# Patient Record
Sex: MALE | URBAN - METROPOLITAN AREA
[De-identification: names, ages, dates, MRNs, and addresses within clinical notes are randomized per-mention and may not be internally consistent; named-entity substitution may affect disease eponyms.]

---

## 2019-05-16 ENCOUNTER — IMPORTED ENCOUNTER (OUTPATIENT)
Dept: URBAN - METROPOLITAN AREA CLINIC 1 | Facility: CLINIC | Age: 34
End: 2019-05-16

## 2019-05-16 PROBLEM — H52.223: Noted: 2019-05-16

## 2019-05-16 PROBLEM — H52.13: Noted: 2019-05-16

## 2019-05-16 PROCEDURE — S0621 ROUTINE OPHTHALMOLOGICAL EXA: HCPCS

## 2019-05-16 NOTE — PATIENT DISCUSSION
1. Myopia OU -- Finalized Glasses MRx was given to patient today for correction if indicated and requested2. Astigmatism OUReturn for an appointment in 1 YR for a [address] OU with Dr. Kvng Pate.

## 2022-04-02 ASSESSMENT — VISUAL ACUITY
OD_SC: 20/20
OS_SC: 20/20

## 2022-04-02 ASSESSMENT — TONOMETRY
OD_IOP_MMHG: 18
OS_IOP_MMHG: 18

## 2022-11-21 ENCOUNTER — NEW PATIENT (OUTPATIENT)
Dept: URBAN - METROPOLITAN AREA CLINIC 1 | Facility: CLINIC | Age: 37
End: 2022-11-21

## 2022-11-21 DIAGNOSIS — H52.13: ICD-10-CM

## 2022-11-21 DIAGNOSIS — H52.223: ICD-10-CM

## 2022-11-21 PROCEDURE — 92004 COMPRE OPH EXAM NEW PT 1/>: CPT

## 2022-11-21 PROCEDURE — 92015 DETERMINE REFRACTIVE STATE: CPT

## 2022-11-21 ASSESSMENT — TONOMETRY
OD_IOP_MMHG: 17
OS_IOP_MMHG: 17

## 2022-11-21 ASSESSMENT — VISUAL ACUITY
OD_CC: J1+
OD_SC: 20/40+2
OS_SC: 20/30+2
OS_CC: J1+
OS_SC: J7
OD_SC: J7
OD_CC: 20/20
OS_CC: 20/20

## 2024-01-03 ENCOUNTER — COMPREHENSIVE EXAM (OUTPATIENT)
Dept: URBAN - METROPOLITAN AREA CLINIC 1 | Facility: CLINIC | Age: 39
End: 2024-01-03

## 2024-01-03 DIAGNOSIS — H52.223: ICD-10-CM

## 2024-01-03 DIAGNOSIS — Z01.00: ICD-10-CM

## 2024-01-03 DIAGNOSIS — H52.13: ICD-10-CM

## 2024-01-03 PROCEDURE — 92014 COMPRE OPH EXAM EST PT 1/>: CPT

## 2024-01-03 PROCEDURE — 92015 DETERMINE REFRACTIVE STATE: CPT

## 2024-01-03 ASSESSMENT — VISUAL ACUITY
OD_CC: J2
OD_CC: 20/25
OS_CC: J1
OS_CC: 20/25

## 2024-01-03 ASSESSMENT — TONOMETRY
OD_IOP_MMHG: 17
OS_IOP_MMHG: 17